# Patient Record
Sex: FEMALE | Race: WHITE | NOT HISPANIC OR LATINO | Employment: FULL TIME | URBAN - METROPOLITAN AREA
[De-identification: names, ages, dates, MRNs, and addresses within clinical notes are randomized per-mention and may not be internally consistent; named-entity substitution may affect disease eponyms.]

---

## 2023-08-17 ENCOUNTER — OFFICE VISIT (OUTPATIENT)
Dept: PRIMARY CARE | Facility: CLINIC | Age: 48
End: 2023-08-17
Payer: COMMERCIAL

## 2023-08-17 VITALS
HEIGHT: 59 IN | DIASTOLIC BLOOD PRESSURE: 79 MMHG | TEMPERATURE: 98 F | WEIGHT: 177.4 LBS | BODY MASS INDEX: 35.76 KG/M2 | SYSTOLIC BLOOD PRESSURE: 116 MMHG | HEART RATE: 64 BPM

## 2023-08-17 DIAGNOSIS — R63.5 ABNORMAL WEIGHT GAIN: Primary | ICD-10-CM

## 2023-08-17 DIAGNOSIS — L25.9 CONTACT DERMATITIS, UNSPECIFIED CONTACT DERMATITIS TYPE, UNSPECIFIED TRIGGER: ICD-10-CM

## 2023-08-17 PROBLEM — E55.9 VITAMIN D DEFICIENCY: Status: ACTIVE | Noted: 2023-08-17

## 2023-08-17 PROBLEM — E66.9 OBESITY: Status: ACTIVE | Noted: 2023-08-17

## 2023-08-17 PROBLEM — L23.9 ALLERGIC DERMATITIS: Status: ACTIVE | Noted: 2023-08-17

## 2023-08-17 PROBLEM — D50.9 ANEMIA, IRON DEFICIENCY: Status: ACTIVE | Noted: 2023-08-17

## 2023-08-17 PROBLEM — M54.12 RIGHT CERVICAL RADICULOPATHY: Status: ACTIVE | Noted: 2023-08-17

## 2023-08-17 PROCEDURE — 3008F BODY MASS INDEX DOCD: CPT | Performed by: FAMILY MEDICINE

## 2023-08-17 PROCEDURE — 99214 OFFICE O/P EST MOD 30 MIN: CPT | Performed by: FAMILY MEDICINE

## 2023-08-17 PROCEDURE — 1036F TOBACCO NON-USER: CPT | Performed by: FAMILY MEDICINE

## 2023-08-17 RX ORDER — HYDROCORTISONE 25 MG/G
CREAM TOPICAL 2 TIMES DAILY PRN
Qty: 30 G | Refills: 2 | Status: SHIPPED | OUTPATIENT
Start: 2023-08-17 | End: 2024-01-10 | Stop reason: ALTCHOICE

## 2023-08-17 NOTE — PROGRESS NOTES
Subjective   Patient ID: Antonieta Villafuerte is a 47 y.o. female who presents for Weight Loss.  HPI  Patient is a 45 yo WF seen today for the treatment of her weight gain.  She states that she used to respond to phentermine.  She is also requesting a cream for the reactions that she developed after shaving her groins.     A review of system was completed.  All systems were reviewed and were normal, except for the ones that are listed in the HPI.    Objective   Physical Exam  Constitutional:       Appearance: Normal appearance.   HENT:      Head: Normocephalic and atraumatic.      Right Ear: Tympanic membrane, ear canal and external ear normal.      Left Ear: Tympanic membrane, ear canal and external ear normal.      Nose: Nose normal.      Mouth/Throat:      Mouth: Mucous membranes are moist.      Pharynx: Oropharynx is clear.   Eyes:      Extraocular Movements: Extraocular movements intact.      Conjunctiva/sclera: Conjunctivae normal.      Pupils: Pupils are equal, round, and reactive to light.   Cardiovascular:      Rate and Rhythm: Normal rate and regular rhythm.      Pulses: Normal pulses.   Pulmonary:      Effort: Pulmonary effort is normal.      Breath sounds: Normal breath sounds.   Abdominal:      General: Abdomen is flat. Bowel sounds are normal.      Palpations: Abdomen is soft.   Musculoskeletal:         General: Normal range of motion.      Cervical back: Normal range of motion and neck supple.   Skin:     General: Skin is warm.   Neurological:      General: No focal deficit present.      Mental Status: She is alert and oriented to person, place, and time. Mental status is at baseline.   Psychiatric:         Mood and Affect: Mood normal.         Behavior: Behavior normal.         Thought Content: Thought content normal.         Judgment: Judgment normal.         Assessment/Plan   Problem List Items Addressed This Visit       Abnormal weight gain - Primary    Relevant Orders    Referral to Weight  Management - Meal Replacement    BMI 35.0-35.9,adult    Relevant Orders    Referral to Weight Management - Meal Replacement    Contact dermatitis    Relevant Medications    hydrocortisone 2.5 % cream            Patient to return to office as needed.

## 2023-08-28 ENCOUNTER — TELEPHONE (OUTPATIENT)
Dept: PRIMARY CARE | Facility: CLINIC | Age: 48
End: 2023-08-28
Payer: COMMERCIAL

## 2023-08-28 NOTE — TELEPHONE ENCOUNTER
Pt called requesting that you change the weight management order. The order has to say comprehensive weight management.

## 2023-10-17 ENCOUNTER — PHARMACY VISIT (OUTPATIENT)
Dept: PHARMACY | Facility: CLINIC | Age: 48
End: 2023-10-17
Payer: COMMERCIAL

## 2023-10-17 PROCEDURE — RXMED WILLOW AMBULATORY MEDICATION CHARGE

## 2024-01-03 DIAGNOSIS — Z30.41 ENCOUNTER FOR SURVEILLANCE OF CONTRACEPTIVE PILLS: Primary | ICD-10-CM

## 2024-01-03 RX ORDER — NORETHINDRONE AND ETHINYL ESTRADIOL 1 MG-35MCG
KIT ORAL
Qty: 448 TABLET | Refills: 0 | Status: SHIPPED | OUTPATIENT
Start: 2024-01-03 | End: 2024-02-13 | Stop reason: SDUPTHER

## 2024-01-04 PROCEDURE — RXMED WILLOW AMBULATORY MEDICATION CHARGE

## 2024-01-05 ENCOUNTER — PHARMACY VISIT (OUTPATIENT)
Dept: PHARMACY | Facility: CLINIC | Age: 49
End: 2024-01-05
Payer: COMMERCIAL

## 2024-01-10 ENCOUNTER — OFFICE VISIT (OUTPATIENT)
Dept: ENDOCRINOLOGY | Facility: CLINIC | Age: 49
End: 2024-01-10
Payer: COMMERCIAL

## 2024-01-10 VITALS
DIASTOLIC BLOOD PRESSURE: 85 MMHG | WEIGHT: 169.5 LBS | TEMPERATURE: 97.1 F | BODY MASS INDEX: 34.17 KG/M2 | SYSTOLIC BLOOD PRESSURE: 136 MMHG | HEIGHT: 59 IN | HEART RATE: 67 BPM

## 2024-01-10 DIAGNOSIS — E66.8 CONSTITUTIONAL OBESITY: ICD-10-CM

## 2024-01-10 DIAGNOSIS — Z76.89 ENCOUNTER FOR WEIGHT MANAGEMENT: ICD-10-CM

## 2024-01-10 PROCEDURE — 99204 OFFICE O/P NEW MOD 45 MIN: CPT | Performed by: INTERNAL MEDICINE

## 2024-01-10 PROCEDURE — 1036F TOBACCO NON-USER: CPT | Performed by: INTERNAL MEDICINE

## 2024-01-10 PROCEDURE — 3008F BODY MASS INDEX DOCD: CPT | Performed by: INTERNAL MEDICINE

## 2024-01-10 RX ORDER — PHENTERMINE HYDROCHLORIDE 37.5 MG/1
TABLET ORAL
Qty: 30 TABLET | Refills: 2 | Status: SHIPPED | OUTPATIENT
Start: 2024-01-10 | End: 2024-05-01

## 2024-01-10 NOTE — PROGRESS NOTES
Patient is seen at the request of Tiana Hensley for my opinion regarding weight management. My final recommendations will be communicated back to the requesting provider by way of shared medical record.    NEW  Subjective   Antonieta Villafuerte is a 48 y.o. female with a hx of anemia, obesity, vit D defic,  who presents for weight management and obesity.  No h/o glaucoma or kidney stones.    Used to see Tiana Hensley  Weight has been a gradual weight gain over the last 10 years.    1. Weight history: weight became an issue gradually in the last 10 years. Gained 10-15lbs in the last year.   --Any previous programs: No    2. Sleep: trouble getting to sleep- does not go to sleep till 1am      3. Stress: 6/10,  for , , 2 kids     4. Exercise: No     5. Appetite control: boredom eat, craves sweets   Breakfast: 3/7 days a week. 2 waffles/piece of cake/ sweets   Lunch: salad/ cheese sticks/ leftovers  Dinner: chicken meals in the crock pot  Take out: twice a week     Any personal history of pancreatitis?: No  Any personal or family history of medullary thyroid cancer or MEN (multiple endocrine neoplasia)?: No    --has taken Saxenda 4 years ago - and liked it  --took phentermine 3 years ago  --took qsymia      Current Outpatient Medications:     norethindrone-ethin estradioL (Alyacen 1/35, 28,) 1-35 mg-mcg tablet, TAKE 1 TABLET BY MOUTH ONCE DAILY CONTINUOUSLY, Disp: 448 tablet, Rfl: 0    ROS:  System: normal  Eyes : no visual changes  Neck : no tenderness, no new lumps/bumps  Respiratory : no SOB  Cardiovascular : no chest pain, no palpitations  Gastro-Intestinal : no abdominal concerns  Neurological : no numbness or tingling in the extremities  Musculoskeletal : no joint paint, no muscle pain  Skin : no unusual rashes  Psychiatric : no depression, no anxiety  See hospitals for Endocrine ROS    Past Medical History:   Diagnosis Date    Acute lymphangitis, unspecified 05/28/2019    Acute lymphangitis     Allergic contact dermatitis due to plants, except food 09/25/2018    Poison ivy    Allergic contact dermatitis, unspecified cause 03/10/2022    Allergic dermatitis    Cellulitis of right lower limb 05/28/2019    Cellulitis of foot, right    Dermatochalasis of right eye, unspecified eyelid 07/05/2017    Dermatochalasis of eyelids of both eyes    Encounter for screening for other suspected endocrine disorder 02/20/2019    Screening for endocrine, nutritional, metabolic and immunity disorder    Otalgia, right ear 05/20/2021    Acute pain of right ear    Pain in unspecified ankle and joints of unspecified foot 02/24/2014    Ankle pain    Pain in unspecified hand 12/16/2015    Hand pain    Personal history of diseases of the skin and subcutaneous tissue 12/19/2018    History of acute dermatitis    Personal history of other (healed) physical injury and trauma 02/12/2014    History of sprain of ankle    Personal history of other diseases of the respiratory system     Personal history of sinusitis    Personal history of other medical treatment 10/23/2017    History of screening mammography    Personal history of urinary (tract) infections 03/16/2015    History of urinary tract infection       Past Surgical History:   Procedure Laterality Date    OTHER SURGICAL HISTORY  05/03/2018    Blepharoplasty Upper Lid W/ Excessive Skin       Social History     Socioeconomic History    Marital status:      Spouse name: Not on file    Number of children: Not on file    Years of education: Not on file    Highest education level: Not on file   Occupational History    Not on file   Tobacco Use    Smoking status: Never    Smokeless tobacco: Never   Substance and Sexual Activity    Alcohol use: Never    Drug use: Never    Sexual activity: Not on file   Other Topics Concern    Not on file   Social History Narrative    Not on file     Social Determinants of Health     Financial Resource Strain: Not on file   Food Insecurity: Not on file    Transportation Needs: Not on file   Physical Activity: Not on file   Stress: Not on file   Social Connections: Not on file   Intimate Partner Violence: Not on file   Housing Stability: Not on file       Objective    Physical Exam:  There were no vitals taken for this visit.  General : alert and oriented X3, no acute distress  Eyes : EOMI   Neck : non tender, supple  Thyroid : no palpable nodules  Heart : regular rate and rhythm, ?soft murmur (asked she discuss with PCP)  ABD : no obvious abnormalities, soft to palpation  Extremities : no edema  Neuro : normal  Other :    Assessment/Plan   Antonieta Villafuerte is a 48 y.o. female with a hx of anemia, obesity, vit D defic,  who presents for weight management and obesity.  No h/o glaucoma or kidney stones.    1. Weight Management : Reviewed the principles of energy metabolism, caloric intake and expenditure, and rationale for a treatment program.  Also reinforced need for reduced calorie, low fat diet and increased physical activity.    We reviewed the possibility of starting an interdisciplinary lifestyle intervention program involving improvement of the diet, a personalized exercise program, efforts to reduce the stress and the possibility of using appetite suppressant medications in an effort to help with the weight loss process.  The patient expressed interest in the plan.    2. Nutrition : I discussed trying one of the diet approaches we have here in the program : Mediterranean lifestyle, ketosis diet.  The patient will be referred to the Registered Dietician for education on their diet of choice.  The dietary booklet was provided in the visit today.    [unfilled]: 169lb, 34.23kg/m2    3. Sleep : trouble getting to sleep- does not go to sleep till 1am    4. Stress : 6/10,  for , , 2 kids    5. Exercise : No - showed her HASFIT videos - encouraged twice per week 10 min.    6. Appetite : is very high for sweets.  --discussed AOM's in  length  --has taken Saxenda 4 years ago - and liked it  --took phentermine 3 years ago  --took qsymia - tried but had heavy vaginal bleeding while on it.    --she would like to re-try phentermine - will have her sign the stimulant contract and I will check OARRS.    Follow up in a group visit.  Ehsan Ivey MD

## 2024-01-11 ENCOUNTER — TELEPHONE (OUTPATIENT)
Dept: ENDOCRINOLOGY | Facility: CLINIC | Age: 49
End: 2024-01-11
Payer: COMMERCIAL

## 2024-01-17 NOTE — PROGRESS NOTES
"Nutrition: Initial Assessment    Reason for Nutrition Visit: Patient is a 48 y.o. female referred for wt mgmt. Referred on 1/10/24 by Dr. Enriquez.      Nutrition Assessment    Food and Nutrient History: Pt presents virtually for nutrition counseling. She was seeing Comp Wt Mgmt prior to COVID-19. Was on Phentermine and felt that it was successful. Then, switched to Saxenda. Lost 40 lbs. Gradually regained weight. Started back on the Phentermine. High craving for sweets. Goes into the office 2 days per week. Has purchased olive oil. Has made other nutrition changes.     Dietary Recall:  Meal 1: 3 scrambled eggs w/ cheese  Meal 2: skipping lunch // gets salad with grilled chicken if at work  Meal 3: Ukrainian onion chicken  Beverages: water, hot tea with 1 sugar packet, iced tea  - Typically would get soda from [facility] [employer] [facility]     Appetite: Normal  Cooking: Patient  Grocery Shopping: Patient  Dietary Supplements: Denies    Labs:  H/o low vitamin D   H/o low B12  H/o marginally elevated T-chol, high LDL, and high TG    Nutrition Focused Physical Exam:  Performed/Deferred: Deferred due to be being virtual visit    Past Medical History:  Patient Active Problem List   Diagnosis    Allergic dermatitis    Anemia, iron deficiency    Obesity    Vitamin D deficiency    Right cervical radiculopathy    Abnormal weight gain    BMI 35.0-35.9,adult    Contact dermatitis        Anthropometrics:  Ht Readings from Last 1 Encounters:   01/18/24 1.499 m (4' 11\")     BMI Readings from Last 1 Encounters:   01/18/24 34.23 kg/m²     Wt Readings from Last 10 Encounters:   01/10/24 76.9 kg (169 lb 8 oz)   08/17/23 80.5 kg (177 lb 6.4 oz)   02/13/23 79.8 kg (176 lb)   10/20/22 78 kg (172 lb)   03/18/22 73.5 kg (162 lb)   02/18/22 78 kg (172 lb)   01/12/22 79.8 kg (176 lb)   11/29/21 79.8 kg (176 lb)   06/24/21 75.8 kg (167 lb)   05/20/21 76.2 kg (168 lb 1.6 oz)     Estimated Energy Needs:    Total Energy Estimated Needs (kCal): 1312.4 kCal "   Method for Estimating Needs: MSJ 1302 x 1.2 - 250   Total Protein Estimated Needs (g): 62 g   Total Protein Estimated Needs (g/kg): 0.8 g/kg    Nutrition Diagnosis     Patient has Nutrition Diagnosis: Yes Diagnosis Status (1): New  Nutrition Diagnosis 1: Excessive energy intake Related to (1): high food cravings As Evidenced by (1): patient's report of high food cravings       Nutrition Interventions/Recommendations   Meals & Snacks:  Mediterranean Diet   Consistent meal/snack schedule     Coordination of Care:  Recommended updating vitamin B12 and vitamin D labs at next routine lab draw     Nutrition Education:   Discussed starting an interdisciplinary weight management program that includes group classes focused on goal setting, nutrition, physical activity, and behavior changes along with the possibility of medication options if medically appropriate.  Recommended starting vitamin D3  5000 International Units daily from a [employer] certified brand.   Discussed adding vitamin B12, especially if future blood work shows low levels.   Talked about drinking tea between meals and not with meals due to ability of tea to block absorption of minerals, such as iron.   *Patient expressed understanding of the nutrition education and denied any additional questions/concerns.     Educational Handouts: Mediterranean Meal Plan Booklet     Nutrition Monitoring and Evaluation   Intentional weight loss of 0.5-2 lb per week, trending toward a clinically significant weight loss of 5-10% of current body weight  Consistent meal/snack pattern  Increased awareness and response to hunger and satiety cues      Readiness to Change : Excellent  Level of Understanding : Excellent  Anticipated Compliant : Excellent     Follow-up: Patient is welcome to follow-up at any time. To schedule, please call [phone].

## 2024-01-18 ENCOUNTER — TELEMEDICINE CLINICAL SUPPORT (OUTPATIENT)
Dept: NUTRITION | Facility: CLINIC | Age: 49
End: 2024-01-18
Payer: COMMERCIAL

## 2024-01-18 VITALS — BODY MASS INDEX: 34.23 KG/M2 | HEIGHT: 59 IN

## 2024-01-18 DIAGNOSIS — Z71.3 DIETARY COUNSELING AND SURVEILLANCE: Primary | ICD-10-CM

## 2024-01-18 DIAGNOSIS — E66.9 OBESITY, UNSPECIFIED CLASSIFICATION, UNSPECIFIED OBESITY TYPE, UNSPECIFIED WHETHER SERIOUS COMORBIDITY PRESENT: ICD-10-CM

## 2024-01-18 PROCEDURE — 97802 MEDICAL NUTRITION INDIV IN: CPT

## 2024-01-18 PROCEDURE — 97802 MEDICAL NUTRITION INDIV IN: CPT | Mod: 95

## 2024-02-13 ENCOUNTER — OFFICE VISIT (OUTPATIENT)
Dept: OBSTETRICS AND GYNECOLOGY | Facility: CLINIC | Age: 49
End: 2024-02-13
Payer: COMMERCIAL

## 2024-02-13 VITALS
SYSTOLIC BLOOD PRESSURE: 108 MMHG | BODY MASS INDEX: 33.87 KG/M2 | DIASTOLIC BLOOD PRESSURE: 71 MMHG | WEIGHT: 168 LBS | HEIGHT: 59 IN

## 2024-02-13 DIAGNOSIS — R39.9 UTI SYMPTOMS: ICD-10-CM

## 2024-02-13 DIAGNOSIS — Z30.41 ENCOUNTER FOR SURVEILLANCE OF CONTRACEPTIVE PILLS: ICD-10-CM

## 2024-02-13 DIAGNOSIS — Z01.419 ENCOUNTER FOR GYNECOLOGICAL EXAMINATION WITHOUT ABNORMAL FINDING: Primary | ICD-10-CM

## 2024-02-13 PROCEDURE — 1036F TOBACCO NON-USER: CPT | Performed by: OBSTETRICS & GYNECOLOGY

## 2024-02-13 PROCEDURE — 3008F BODY MASS INDEX DOCD: CPT | Performed by: OBSTETRICS & GYNECOLOGY

## 2024-02-13 PROCEDURE — 99396 PREV VISIT EST AGE 40-64: CPT | Performed by: OBSTETRICS & GYNECOLOGY

## 2024-02-13 PROCEDURE — 87624 HPV HI-RISK TYP POOLED RSLT: CPT

## 2024-02-13 PROCEDURE — 88175 CYTOPATH C/V AUTO FLUID REDO: CPT

## 2024-02-13 RX ORDER — NORETHINDRONE AND ETHINYL ESTRADIOL 1 MG-35MCG
KIT ORAL
Qty: 448 TABLET | Refills: 4 | Status: SHIPPED | OUTPATIENT
Start: 2024-02-13 | End: 2025-02-11

## 2024-02-13 NOTE — PROGRESS NOTES
Subjective   Antonieta Villafuerte is a 48 y.o. female here for a routine exam. Current complaints: She mentions possible UTI symptoms.  She has about 3 to 4-day history of urinary odor and bright color.  She denies any pressure or urgency.  No fevers or chills.  No change in bowel habits or vaginal discharge.    She uses a birth control pills continuously and has no breakthrough bleeding.. Personal health questionnaire reviewed: yes.     Gynecologic History  No LMP recorded (lmp unknown).  Contraception: OCP (estrogen/progesterone)  Last Pap: 11/29/21. Results were: normal  Last mammogram: 9/24/2020. Results were: normal    Obstetric History  OB History   No obstetric history on file.       Objective   Constitutional: Alert and in no acute distress. Well developed, well nourished.   Head and Face: Head and face: Normal.    Eyes: Normal external exam - nonicteric sclera, extraocular movements intact (EOMI) and no ptosis.   Neck: No neck asymmetry. Supple. Thyroid not enlarged and there were no palpable thyroid nodules.    Pulmonary: No respiratory distress.   Chest: Breasts: Normal appearance, no nipple discharge and no skin changes. Palpation of breasts and axillae: No palpable mass and no axillary lymphadenopathy.   Abdomen: Soft nontender; no abdominal mass palpated. No organomegaly. No hernias.   Genitourinary: External genitalia: Normal. No inguinal lymphadenopathy. Bartholin's Urethral and Skenes Glands: Normal. Urethra: Normal.  Bladder: Normal on palpation. Vagina: Normal. Cervix: Normal.  Uterus: Normal.  Right Adnexa/parametria: Normal.  Left Adnexa/parametria: Normal.  Inspection of Perianal Area: Normal.   Musculoskeletal: No joint swelling seen, normal movements of all extremities.   Skin: Normal skin color and pigmentation, normal skin turgor, and no rash.   Neurologic: Non-focal. Grossly intact.   Psychiatric: Alert and oriented x 3. Affect normal to patient baseline. Mood: Appropriate.  Physical Exam      Assessment/Plan   Healthy female exam.  This is a 48-year-old female with a normal exam.  A Pap was sent.    We discussed a urine culture was ordered to evaluate for UTI symptoms, we discussed that her symptoms are suspicious for concentrated urine.  I recommend increasing fluids.    A refill for birth control was ordered to the  [place] location.    Her routine mammogram was ordered with tomosynthesis.  I will see her routinely in 1 year.  Mammogram ordered.

## 2024-02-23 LAB
CYTOLOGY CMNT CVX/VAG CYTO-IMP: NORMAL
HPV HR 12 DNA GENITAL QL NAA+PROBE: NEGATIVE
HPV HR GENOTYPES PNL CVX NAA+PROBE: NEGATIVE
HPV16 DNA SPEC QL NAA+PROBE: NEGATIVE
HPV18 DNA SPEC QL NAA+PROBE: NEGATIVE
LAB AP CONTRACEPTIVE HISTORY: NORMAL
LAB AP HPV GENOTYPE QUESTION: YES
LAB AP HPV HR: NORMAL
LABORATORY COMMENT REPORT: NORMAL
PATH REPORT.TOTAL CANCER: NORMAL

## 2024-03-04 PROCEDURE — RXMED WILLOW AMBULATORY MEDICATION CHARGE

## 2024-03-05 ENCOUNTER — PHARMACY VISIT (OUTPATIENT)
Dept: PHARMACY | Facility: CLINIC | Age: 49
End: 2024-03-05
Payer: COMMERCIAL

## 2024-03-23 ENCOUNTER — HOSPITAL ENCOUNTER (OUTPATIENT)
Dept: RADIOLOGY | Facility: HOSPITAL | Age: 49
Discharge: HOME | End: 2024-03-23
Payer: COMMERCIAL

## 2024-03-23 VITALS — BODY MASS INDEX: 33.87 KG/M2 | HEIGHT: 59 IN | WEIGHT: 168 LBS

## 2024-03-23 DIAGNOSIS — Z12.31 SCREENING MAMMOGRAM FOR BREAST CANCER: ICD-10-CM

## 2024-03-23 PROCEDURE — 77067 SCR MAMMO BI INCL CAD: CPT | Performed by: RADIOLOGY

## 2024-03-23 PROCEDURE — 77063 BREAST TOMOSYNTHESIS BI: CPT | Performed by: RADIOLOGY

## 2024-03-23 PROCEDURE — 77067 SCR MAMMO BI INCL CAD: CPT

## 2024-05-01 RX ORDER — PHENTERMINE HYDROCHLORIDE 37.5 MG/1
TABLET ORAL
Qty: 30 TABLET | Refills: 2 | Status: SHIPPED | OUTPATIENT
Start: 2024-05-01 | End: 2024-05-01 | Stop reason: SDUPTHER

## 2024-05-01 RX ORDER — PHENTERMINE HYDROCHLORIDE 37.5 MG/1
TABLET ORAL
Qty: 30 TABLET | Refills: 2 | Status: SHIPPED | OUTPATIENT
Start: 2024-05-01

## 2024-05-06 PROCEDURE — RXMED WILLOW AMBULATORY MEDICATION CHARGE

## 2024-05-10 ENCOUNTER — PHARMACY VISIT (OUTPATIENT)
Dept: PHARMACY | Facility: CLINIC | Age: 49
End: 2024-05-10
Payer: COMMERCIAL

## 2024-05-22 PROCEDURE — RXMED WILLOW AMBULATORY MEDICATION CHARGE

## 2024-05-23 ENCOUNTER — PHARMACY VISIT (OUTPATIENT)
Dept: PHARMACY | Facility: CLINIC | Age: 49
End: 2024-05-23
Payer: COMMERCIAL

## 2024-05-28 ENCOUNTER — APPOINTMENT (OUTPATIENT)
Dept: ENDOCRINOLOGY | Facility: CLINIC | Age: 49
End: 2024-05-28
Payer: COMMERCIAL

## 2024-06-03 PROCEDURE — RXMED WILLOW AMBULATORY MEDICATION CHARGE

## 2024-06-06 ENCOUNTER — PHARMACY VISIT (OUTPATIENT)
Dept: PHARMACY | Facility: CLINIC | Age: 49
End: 2024-06-06
Payer: COMMERCIAL

## 2024-07-09 ENCOUNTER — PHARMACY VISIT (OUTPATIENT)
Dept: PHARMACY | Facility: CLINIC | Age: 49
End: 2024-07-09

## 2024-07-09 PROCEDURE — RXMED WILLOW AMBULATORY MEDICATION CHARGE

## 2024-07-18 PROCEDURE — RXMED WILLOW AMBULATORY MEDICATION CHARGE

## 2024-07-19 ENCOUNTER — PHARMACY VISIT (OUTPATIENT)
Dept: PHARMACY | Facility: CLINIC | Age: 49
End: 2024-07-19
Payer: COMMERCIAL

## 2024-07-30 RX ORDER — PHENTERMINE HYDROCHLORIDE 37.5 MG/1
TABLET ORAL
Qty: 30 TABLET | Refills: 2 | Status: SHIPPED | OUTPATIENT
Start: 2024-07-30

## 2024-09-01 PROCEDURE — RXMED WILLOW AMBULATORY MEDICATION CHARGE

## 2024-09-04 ENCOUNTER — PHARMACY VISIT (OUTPATIENT)
Dept: PHARMACY | Facility: CLINIC | Age: 49
End: 2024-09-04
Payer: COMMERCIAL

## 2024-10-02 PROCEDURE — RXMED WILLOW AMBULATORY MEDICATION CHARGE

## 2024-10-08 ENCOUNTER — PHARMACY VISIT (OUTPATIENT)
Dept: PHARMACY | Facility: CLINIC | Age: 49
End: 2024-10-08
Payer: COMMERCIAL

## 2024-11-05 PROCEDURE — RXMED WILLOW AMBULATORY MEDICATION CHARGE

## 2024-11-07 ENCOUNTER — PHARMACY VISIT (OUTPATIENT)
Dept: PHARMACY | Facility: CLINIC | Age: 49
End: 2024-11-07
Payer: COMMERCIAL

## 2024-11-30 RX ORDER — PHENTERMINE HYDROCHLORIDE 37.5 MG/1
TABLET ORAL
Qty: 30 TABLET | Refills: 2 | Status: CANCELLED | OUTPATIENT
Start: 2024-11-30

## 2024-12-13 ENCOUNTER — PHARMACY VISIT (OUTPATIENT)
Dept: PHARMACY | Facility: CLINIC | Age: 49
End: 2024-12-13
Payer: COMMERCIAL

## 2024-12-13 PROCEDURE — RXMED WILLOW AMBULATORY MEDICATION CHARGE

## 2024-12-18 PROCEDURE — RXMED WILLOW AMBULATORY MEDICATION CHARGE

## 2024-12-18 RX ORDER — PHENTERMINE HYDROCHLORIDE 37.5 MG/1
TABLET ORAL
Qty: 30 TABLET | Refills: 2 | Status: SHIPPED | OUTPATIENT
Start: 2024-12-18

## 2024-12-20 ENCOUNTER — PHARMACY VISIT (OUTPATIENT)
Dept: PHARMACY | Facility: CLINIC | Age: 49
End: 2024-12-20
Payer: COMMERCIAL

## 2025-01-19 PROCEDURE — RXMED WILLOW AMBULATORY MEDICATION CHARGE

## 2025-01-20 ENCOUNTER — PHARMACY VISIT (OUTPATIENT)
Dept: PHARMACY | Facility: CLINIC | Age: 50
End: 2025-01-20
Payer: COMMERCIAL

## 2025-01-22 ENCOUNTER — OFFICE VISIT (OUTPATIENT)
Dept: URGENT CARE | Age: 50
End: 2025-01-22
Payer: COMMERCIAL

## 2025-01-22 VITALS
SYSTOLIC BLOOD PRESSURE: 119 MMHG | DIASTOLIC BLOOD PRESSURE: 79 MMHG | RESPIRATION RATE: 16 BRPM | HEART RATE: 93 BPM | OXYGEN SATURATION: 94 %

## 2025-01-22 DIAGNOSIS — J40 BRONCHITIS: Primary | ICD-10-CM

## 2025-01-22 RX ORDER — AZITHROMYCIN 250 MG/1
TABLET, FILM COATED ORAL
Qty: 6 TABLET | Refills: 0 | Status: SHIPPED | OUTPATIENT
Start: 2025-01-22 | End: 2025-01-27

## 2025-01-22 RX ORDER — PREDNISONE 20 MG/1
40 TABLET ORAL DAILY
Qty: 10 TABLET | Refills: 0 | Status: SHIPPED | OUTPATIENT
Start: 2025-01-22 | End: 2025-01-27

## 2025-01-22 RX ORDER — ALBUTEROL SULFATE 90 UG/1
2 INHALANT RESPIRATORY (INHALATION) EVERY 4 HOURS PRN
Qty: 200 G | Refills: 0 | Status: SHIPPED | OUTPATIENT
Start: 2025-01-22 | End: 2026-01-22

## 2025-01-22 ASSESSMENT — ENCOUNTER SYMPTOMS
PALPITATIONS: 0
FEVER: 1
RHINORRHEA: 0
CHILLS: 1
CHEST TIGHTNESS: 0
MYALGIAS: 1
CONSTIPATION: 0
NAUSEA: 0
WHEEZING: 0
COUGH: 1
TROUBLE SWALLOWING: 0
DIARRHEA: 0
SORE THROAT: 0
SHORTNESS OF BREATH: 0

## 2025-01-22 NOTE — PATIENT INSTRUCTIONS
Suspected bronchitis  Start antibiotic  Start steroid, inhaler (every 4-6 hours to start then as needed every 4-6 hours, 2 puffs each time)    Recommended Salt water gargles, hot tea and honey, tylenol/ibuprofen    Recommended mucinex over the counter    Alternate tylenol/ibuprofen every 3 hours  Tylenol: 500-1000 mg every 6 hours (do not exceed 4000 mg in 24 hour period)  Ibuprofen 400-600 mg every 6 hours.     If you develop chest pain, shortness of breath, fever not reduced with tylenol/ibuprofen go to ER.     Follow up with pcp.

## 2025-01-22 NOTE — PROGRESS NOTES
Subjective   Patient ID: Antonieta Villafuerte is a 49 y.o. female. They present today with a chief complaint of Cough (Pt c/o new onset productive cough following 8 days of congestion, fatigue, and body aches).    History of Present Illness  Patient presents with 8-9 days of dry cough, coughing fits and fever. Claims body aches, chills. Denies n/v/d. Denies chest pain, sob. Denies congestion, sore throat. Denies history of asthma, copd or hospitalization for pneumonia in the past. Cough has become productive over the last today with return of low grade fever.       Cough  Associated symptoms include chills, a fever and myalgias. Pertinent negatives include no chest pain, ear pain, postnasal drip, rash, rhinorrhea, sore throat, shortness of breath or wheezing.       Past Medical History  Allergies as of 01/22/2025    (No Known Allergies)       (Not in a hospital admission)       Past Medical History:   Diagnosis Date    Acute lymphangitis, unspecified 05/28/2019    Acute lymphangitis    Allergic contact dermatitis due to plants, except food 09/25/2018    Poison ivy    Allergic contact dermatitis, unspecified cause 03/10/2022    Allergic dermatitis    Cellulitis of right lower limb 05/28/2019    Cellulitis of foot, right    Dermatochalasis of right eye, unspecified eyelid 07/05/2017    Dermatochalasis of eyelids of both eyes    Encounter for screening for other suspected endocrine disorder 02/20/2019    Screening for endocrine, nutritional, metabolic and immunity disorder    Otalgia, right ear 05/20/2021    Acute pain of right ear    Pain in unspecified ankle and joints of unspecified foot 02/24/2014    Ankle pain    Pain in unspecified hand 12/16/2015    Hand pain    Personal history of diseases of the skin and subcutaneous tissue 12/19/2018    History of acute dermatitis    Personal history of other (healed) physical injury and trauma 02/12/2014    History of sprain of ankle    Personal history of other diseases of  the respiratory system     Personal history of sinusitis    Personal history of other medical treatment 10/23/2017    History of screening mammography    Personal history of urinary (tract) infections 03/16/2015    History of urinary tract infection       Past Surgical History:   Procedure Laterality Date    OTHER SURGICAL HISTORY  05/03/2018    Blepharoplasty Upper Lid W/ Excessive Skin        reports that she has never smoked. She has never used smokeless tobacco. She reports that she does not drink alcohol and does not use drugs.    Review of Systems  Review of Systems   Constitutional:  Positive for chills and fever.   HENT:  Negative for congestion, ear pain, postnasal drip, rhinorrhea, sore throat and trouble swallowing.    Respiratory:  Positive for cough. Negative for chest tightness, shortness of breath and wheezing.    Cardiovascular:  Negative for chest pain and palpitations.   Gastrointestinal:  Negative for constipation, diarrhea and nausea.   Musculoskeletal:  Positive for myalgias.   Skin:  Negative for rash.                                  Objective    Vitals:    01/22/25 1300   BP: 119/79   Pulse: 93   Resp: 16   SpO2: 94%     No LMP recorded.    Physical Exam  Constitutional:       General: She is not in acute distress.     Appearance: She is not toxic-appearing.   HENT:      Right Ear: Tympanic membrane and external ear normal.      Left Ear: Tympanic membrane and external ear normal.      Nose: No congestion.      Right Sinus: No frontal sinus tenderness.      Left Sinus: No frontal sinus tenderness.      Mouth/Throat:      Mouth: Mucous membranes are moist. No oral lesions.      Pharynx: Postnasal drip present. No oropharyngeal exudate or posterior oropharyngeal erythema.   Eyes:      Pupils: Pupils are equal, round, and reactive to light.   Cardiovascular:      Rate and Rhythm: Normal rate and regular rhythm.   Pulmonary:      Effort: Pulmonary effort is normal. No respiratory distress.       Breath sounds: Examination of the right-upper field reveals wheezing. Examination of the left-upper field reveals wheezing. Examination of the right-middle field reveals wheezing. Examination of the left-middle field reveals wheezing. Examination of the right-lower field reveals wheezing. Examination of the left-lower field reveals wheezing. Wheezing present.      Comments: Mild expiratory wheeze noted diffuse b/l. Normal resp effort.   Musculoskeletal:      Cervical back: Normal range of motion.   Neurological:      Mental Status: She is alert.         Procedures    Point of Care Test & Imaging Results from this visit  No results found for this visit on 01/22/25.   No results found.    Diagnostic study results (if any) were reviewed by Radha Castro PA-C.    Assessment/Plan   Allergies, medications, history, and pertinent labs/EKGs/Imaging reviewed by Radha Castro PA-C.     Medical Decision Making  MDM- History and exam consistent with acute bronchitis. No evidence of pneumonia, sepsis or other acute cardiopulmonary pathology. Based on current exam I don't feel that imaging, labs, or further work up are warranted at this point. Based on current exam and past medical history, plan is for antibiotics and symptomatic therapies. Patient advised to return to clinic or go to the ED if symptoms change or worsen. Patient verbalized understanding and agrees with plan.       Orders and Diagnoses  Diagnoses and all orders for this visit:  Bronchitis  -     predniSONE (Deltasone) 20 mg tablet; Take 2 tablets (40 mg) by mouth once daily for 5 days.  -     albuterol (ProAir HFA) 90 mcg/actuation inhaler; Inhale 2 puffs every 4 hours if needed for wheezing or shortness of breath.  -     azithromycin (Zithromax) 250 mg tablet; Take 2 tabs (500 mg) by mouth today, than 1 daily for 4 days.      Medical Admin Record      Patient disposition: Home    Electronically signed by Radha Castro PA-C  1:09 PM

## 2025-02-07 ENCOUNTER — OFFICE VISIT (OUTPATIENT)
Dept: PRIMARY CARE | Facility: CLINIC | Age: 50
End: 2025-02-07
Payer: COMMERCIAL

## 2025-02-07 ENCOUNTER — HOSPITAL ENCOUNTER (OUTPATIENT)
Dept: RADIOLOGY | Facility: CLINIC | Age: 50
Discharge: HOME | End: 2025-02-07
Payer: COMMERCIAL

## 2025-02-07 VITALS
BODY MASS INDEX: 33.33 KG/M2 | WEIGHT: 165 LBS | DIASTOLIC BLOOD PRESSURE: 71 MMHG | TEMPERATURE: 98.2 F | SYSTOLIC BLOOD PRESSURE: 109 MMHG | HEART RATE: 70 BPM

## 2025-02-07 DIAGNOSIS — J20.9 ACUTE BRONCHITIS, UNSPECIFIED ORGANISM: ICD-10-CM

## 2025-02-07 DIAGNOSIS — N30.00 ACUTE CYSTITIS WITHOUT HEMATURIA: Primary | ICD-10-CM

## 2025-02-07 DIAGNOSIS — Z11.59 ENCOUNTER FOR HEPATITIS C SCREENING TEST FOR LOW RISK PATIENT: ICD-10-CM

## 2025-02-07 DIAGNOSIS — Z13.1 DIABETES MELLITUS SCREENING: ICD-10-CM

## 2025-02-07 DIAGNOSIS — Z12.11 COLON CANCER SCREENING: ICD-10-CM

## 2025-02-07 DIAGNOSIS — Z13.220 SCREENING CHOLESTEROL LEVEL: ICD-10-CM

## 2025-02-07 DIAGNOSIS — Z00.00 HEALTH CARE MAINTENANCE: ICD-10-CM

## 2025-02-07 DIAGNOSIS — Z11.4 ENCOUNTER FOR SCREENING FOR HIV: ICD-10-CM

## 2025-02-07 DIAGNOSIS — Z23 IMMUNIZATION DUE: ICD-10-CM

## 2025-02-07 DIAGNOSIS — Z12.31 VISIT FOR SCREENING MAMMOGRAM: ICD-10-CM

## 2025-02-07 PROBLEM — N10 ACUTE PYELONEPHRITIS: Status: ACTIVE | Noted: 2025-02-07

## 2025-02-07 PROCEDURE — RXMED WILLOW AMBULATORY MEDICATION CHARGE

## 2025-02-07 PROCEDURE — 71046 X-RAY EXAM CHEST 2 VIEWS: CPT

## 2025-02-07 PROCEDURE — 99396 PREV VISIT EST AGE 40-64: CPT | Performed by: FAMILY MEDICINE

## 2025-02-07 PROCEDURE — 99214 OFFICE O/P EST MOD 30 MIN: CPT | Performed by: FAMILY MEDICINE

## 2025-02-07 RX ORDER — BUDESONIDE, GLYCOPYRROLATE, AND FORMOTEROL FUMARATE 160; 9; 4.8 UG/1; UG/1; UG/1
2 AEROSOL, METERED RESPIRATORY (INHALATION) 2 TIMES DAILY
Qty: 10.7 G | Refills: 1 | Status: SHIPPED | OUTPATIENT
Start: 2025-02-07

## 2025-02-07 NOTE — ASSESSMENT & PLAN NOTE
-Partially resolved.  She has not responded to the inhaler given at the local urgent care. The prednisone and Zpack partially helped.  -CXR ordered.  -Bretzi 2 puffs BID for 1 month started.

## 2025-02-07 NOTE — ASSESSMENT & PLAN NOTE
-mammogram 3/23/2024. Recommended.  Patient states that it will  be ordered by her GYN this month.   -colonoscopy never done.  Declined.  Patient states that she already had a Cologuard kit at home.   -pap smear 02/13/2024.  -blood test ordered.   -immunizations: Influenza UTD at work.  COVID 19 declined.

## 2025-02-07 NOTE — PROGRESS NOTES
Subjective   Patient ID: Antonieta Villafuerte is a 49 y.o. female who presents for a UTI.  HPI  Patient is a 48 yo Female seen today for:    1- CPE.  -mammogram 3/23/2024. Recommended.  Patient states that it will  be ordered by her GYN this month.   -colonoscopy never done.  Declined.  Patient states that she already had a Cologuard kit at home.   -pap smear 02/13/2024.  -blood test ordered.   -immunizations: Influenza vaccine UTD at work.       2- the treatment of a possible UTI.  She has noted a new onset of urinary incontinence that started with her UTI present for the past 4 weeks.    3- Persisting cough and mild difficulty with inhalation.  She has not responded to the inhaler given at the local urgent care. The prednisone and Zpack partially helped.    A review of system was completed.  All systems were reviewed and were normal, except for the ones that are listed in the HPI.    Objective   Physical Exam  Constitutional:       Appearance: Normal appearance.   HENT:      Head: Normocephalic and atraumatic.      Right Ear: Tympanic membrane, ear canal and external ear normal.      Left Ear: Tympanic membrane, ear canal and external ear normal.      Nose: Nose normal.      Mouth/Throat:      Mouth: Mucous membranes are moist.      Pharynx: Oropharynx is clear.   Eyes:      Extraocular Movements: Extraocular movements intact.      Conjunctiva/sclera: Conjunctivae normal.      Pupils: Pupils are equal, round, and reactive to light.   Cardiovascular:      Rate and Rhythm: Normal rate and regular rhythm.      Pulses: Normal pulses.   Pulmonary:      Effort: Pulmonary effort is normal.      Breath sounds: Normal breath sounds.   Abdominal:      General: Abdomen is flat. Bowel sounds are normal.      Palpations: Abdomen is soft.   Musculoskeletal:         General: Normal range of motion.      Cervical back: Normal range of motion and neck supple.   Skin:     General: Skin is warm.      Comments: -Breast and pelvis exam  done by GYN.    Neurological:      General: No focal deficit present.      Mental Status: She is alert and oriented to person, place, and time. Mental status is at baseline.   Psychiatric:         Mood and Affect: Mood normal.         Behavior: Behavior normal.         Thought Content: Thought content normal.         Judgment: Judgment normal.     Assessment/Plan   Problem List Items Addressed This Visit       Acute cystitis without hematuria     -UA w/ C+S ordered.          Relevant Orders    POCT UA Automated manually resulted    Urine Culture    CBC    Comprehensive Metabolic Panel    Health care maintenance - Primary     -mammogram 3/23/2024. Recommended.  Patient states that it will  be ordered by her GYN this month.   -colonoscopy never done.  Declined.  Patient states that she already had a Cologuard kit at home.   -pap smear 02/13/2024.  -blood test ordered.   -immunizations: Influenza UTD at work.  COVID 19 declined.          Relevant Medications    budesonide-glycopyr-formoterol (Breztri Aerosphere) 160-9-4.8 mcg/actuation HFA aerosol inhaler    Other Relevant Orders    Hemoglobin A1C    Lipid Panel    TSH with reflex to Free T4 if abnormal    HIV 1/2 Antigen/Antibody Screen with Reflex to Confirmation    Hepatitis C Antibody    XR chest 2 views    Screening cholesterol level    Relevant Orders    Lipid Panel    Diabetes mellitus screening    Relevant Orders    Hemoglobin A1C    Visit for screening mammogram    Colon cancer screening    Immunization due    Encounter for hepatitis C screening test for low risk patient    Relevant Orders    Hepatitis C Antibody    Encounter for screening for HIV    Relevant Orders    HIV 1/2 Antigen/Antibody Screen with Reflex to Confirmation    Acute bronchitis     -Partially resolved.  She has not responded to the inhaler given at the local urgent care. The prednisone and Zpack partially helped.  -CXR ordered.  -Bretzi 2 puffs BID for 1 month started.         Relevant  Orders    CBC    Comprehensive Metabolic Panel    TSH with reflex to Free T4 if abnormal    XR chest 2 views    Patient to return to office in 2- 4 weeks for reassessment.

## 2025-02-08 ENCOUNTER — PHARMACY VISIT (OUTPATIENT)
Dept: PHARMACY | Facility: CLINIC | Age: 50
End: 2025-02-08
Payer: COMMERCIAL

## 2025-02-08 LAB
ALBUMIN SERPL-MCNC: 4.1 G/DL (ref 3.6–5.1)
ALP SERPL-CCNC: 49 U/L (ref 31–125)
ALT SERPL-CCNC: 8 U/L (ref 6–29)
ANION GAP SERPL CALCULATED.4IONS-SCNC: 8 MMOL/L (CALC) (ref 7–17)
APPEARANCE UR: CLEAR
AST SERPL-CCNC: 10 U/L (ref 10–35)
BACTERIA #/AREA URNS HPF: ABNORMAL /HPF
BILIRUB SERPL-MCNC: 0.4 MG/DL (ref 0.2–1.2)
BILIRUB UR QL STRIP: NEGATIVE
BUN SERPL-MCNC: 12 MG/DL (ref 7–25)
CALCIUM SERPL-MCNC: 9 MG/DL (ref 8.6–10.2)
CHLORIDE SERPL-SCNC: 103 MMOL/L (ref 98–110)
CHOLEST SERPL-MCNC: 227 MG/DL
CHOLEST/HDLC SERPL: 4.2 (CALC)
CO2 SERPL-SCNC: 27 MMOL/L (ref 20–32)
COLOR UR: YELLOW
CREAT SERPL-MCNC: 0.79 MG/DL (ref 0.5–0.99)
EGFRCR SERPLBLD CKD-EPI 2021: 92 ML/MIN/1.73M2
ERYTHROCYTE [DISTWIDTH] IN BLOOD BY AUTOMATED COUNT: 13.6 % (ref 11–15)
EST. AVERAGE GLUCOSE BLD GHB EST-MCNC: 117 MG/DL
EST. AVERAGE GLUCOSE BLD GHB EST-SCNC: 6.5 MMOL/L
GLUCOSE SERPL-MCNC: 87 MG/DL (ref 65–139)
GLUCOSE UR QL STRIP: NEGATIVE
HBA1C MFR BLD: 5.7 % OF TOTAL HGB
HCT VFR BLD AUTO: 39.6 % (ref 35–45)
HCV AB SERPL QL IA: NORMAL
HDLC SERPL-MCNC: 54 MG/DL
HGB BLD-MCNC: 13.1 G/DL (ref 11.7–15.5)
HGB UR QL STRIP: ABNORMAL
HIV 1+2 AB+HIV1 P24 AG SERPL QL IA: NORMAL
HYALINE CASTS #/AREA URNS LPF: ABNORMAL /LPF
KETONES UR QL STRIP: NEGATIVE
LDLC SERPL CALC-MCNC: 136 MG/DL (CALC)
LEUKOCYTE ESTERASE UR QL STRIP: NEGATIVE
MCH RBC QN AUTO: 31.2 PG (ref 27–33)
MCHC RBC AUTO-ENTMCNC: 33.1 G/DL (ref 32–36)
MCV RBC AUTO: 94.3 FL (ref 80–100)
NITRITE UR QL STRIP: NEGATIVE
NONHDLC SERPL-MCNC: 173 MG/DL (CALC)
PH UR STRIP: 7.5 [PH] (ref 5–8)
PLATELET # BLD AUTO: 367 THOUSAND/UL (ref 140–400)
PMV BLD REES-ECKER: 9.9 FL (ref 7.5–12.5)
POTASSIUM SERPL-SCNC: 4.7 MMOL/L (ref 3.5–5.3)
PROT SERPL-MCNC: 7 G/DL (ref 6.1–8.1)
PROT UR QL STRIP: ABNORMAL
RBC # BLD AUTO: 4.2 MILLION/UL (ref 3.8–5.1)
RBC #/AREA URNS HPF: ABNORMAL /HPF
SERVICE CMNT-IMP: ABNORMAL
SODIUM SERPL-SCNC: 138 MMOL/L (ref 135–146)
SP GR UR STRIP: 1.02 (ref 1–1.03)
SQUAMOUS #/AREA URNS HPF: ABNORMAL /HPF
TRIGL SERPL-MCNC: 229 MG/DL
TSH SERPL-ACNC: 1.74 MIU/L
WBC # BLD AUTO: 8.5 THOUSAND/UL (ref 3.8–10.8)
WBC #/AREA URNS HPF: ABNORMAL /HPF

## 2025-02-11 NOTE — RESULT ENCOUNTER NOTE
PatientPatient's blood test showed signs of elevated blood sugar in the prediabetes range and elevated cholesterol in the blood.  Low-fat/low-cholesterol/low sugar/low carbohydrate diet, weight loss and increased exercise is recommended.  The rest of the blood test was normal and the chest x-ray was also normal.

## 2025-02-11 NOTE — RESULT ENCOUNTER NOTE
Patient's blood test showed signs of elevated blood sugar in the prediabetes range and elevated cholesterol in the blood.  Low-fat/low-cholesterol/low sugar/low carbohydrate diet, weight loss and increased exercise is recommended.  The rest of the blood test was normal.

## 2025-02-12 ENCOUNTER — TELEPHONE (OUTPATIENT)
Dept: PRIMARY CARE | Facility: CLINIC | Age: 50
End: 2025-02-12
Payer: COMMERCIAL

## 2025-02-17 DIAGNOSIS — R73.09 ELEVATED HEMOGLOBIN A1C: ICD-10-CM

## 2025-02-17 DIAGNOSIS — E78.00 PURE HYPERCHOLESTEROLEMIA: Primary | ICD-10-CM

## 2025-02-18 ENCOUNTER — APPOINTMENT (OUTPATIENT)
Dept: OBSTETRICS AND GYNECOLOGY | Facility: CLINIC | Age: 50
End: 2025-02-18
Payer: COMMERCIAL

## 2025-02-18 VITALS
SYSTOLIC BLOOD PRESSURE: 125 MMHG | WEIGHT: 175 LBS | HEIGHT: 59 IN | DIASTOLIC BLOOD PRESSURE: 76 MMHG | HEART RATE: 65 BPM | BODY MASS INDEX: 35.28 KG/M2

## 2025-02-18 DIAGNOSIS — Z12.11 SCREEN FOR COLON CANCER: Primary | ICD-10-CM

## 2025-02-18 DIAGNOSIS — Z01.419 ENCOUNTER FOR GYNECOLOGICAL EXAMINATION WITHOUT ABNORMAL FINDING: ICD-10-CM

## 2025-02-18 DIAGNOSIS — Z30.41 ENCOUNTER FOR SURVEILLANCE OF CONTRACEPTIVE PILLS: ICD-10-CM

## 2025-02-18 PROCEDURE — RXMED WILLOW AMBULATORY MEDICATION CHARGE

## 2025-02-18 RX ORDER — NORETHINDRONE AND ETHINYL ESTRADIOL 1 MG-35MCG
KIT ORAL
Qty: 448 TABLET | Refills: 4 | Status: SHIPPED | OUTPATIENT
Start: 2025-02-18 | End: 2026-02-17

## 2025-02-18 NOTE — PROGRESS NOTES
Subjective   Antonieta Villafuerte is a 49 y.o. female here for a routine exam.  She uses a birth control pills continuously and has cycles on schedule.  There is no breakthrough bleeding.  No discharge no dysuria, no change in bowel habits.  She has noted some stress incontinence with recent coughing, discussed Kegel exercises.    There are no menopausal symptoms.  Personal health questionnaire reviewed: yes.     Gynecologic History  No LMP recorded (lmp unknown).  Contraception: OCP (estrogen/progesterone)  Last Pap: 24. Results were: normal  Last mammogram: 3/23/24. Results were: normal    Obstetric History  OB History    Para Term  AB Living   3 2           SAB IAB Ectopic Multiple Live Births                  # Outcome Date GA Lbr Cecil/2nd Weight Sex Type Anes PTL Lv   3             2 Para            1 Para                Objective   Constitutional: Alert and in no acute distress. Well developed, well nourished.   Head and Face: Head and face: Normal.    Eyes: Normal external exam - nonicteric sclera, extraocular movements intact (EOMI) and no ptosis.   Neck: No neck asymmetry. Supple. Thyroid not enlarged and there were no palpable thyroid nodules.    Pulmonary: No respiratory distress.   Chest: Breasts: Normal appearance, no nipple discharge and no skin changes. Palpation of breasts and axillae: No palpable mass and no axillary lymphadenopathy.   Abdomen: Soft nontender; no abdominal mass palpated. No organomegaly. No hernias.   Genitourinary: External genitalia: Normal. No inguinal lymphadenopathy. Bartholin's Urethral and Skenes Glands: Normal. Urethra: Normal.  Bladder: Normal on palpation. Vagina: Normal. Cervix: Normal.  Uterus: Normal.  Right Adnexa/parametria: Normal.  Left Adnexa/parametria: Normal.  Inspection of Perianal Area: Normal.   Musculoskeletal: No joint swelling seen, normal movements of all extremities.   Skin: Normal skin color and pigmentation, normal skin turgor,  and no rash.   Neurologic: Non-focal. Grossly intact.   Psychiatric: Alert and oriented x 3. Affect normal to patient baseline. Mood: Appropriate.  Physical Exam     Assessment/Plan   Healthy female exam.  This is a 49-year-old female with a normal exam.  No Pap smear was sent, she is high risk HPV negative in 2024.    Routine mammogram was ordered with tomosynthesis.    We discussed colon cancer screening options, and a Cologuard was ordered.    A refill for birth control was ordered to the pharmacy.    I will see her in 1 year.  Education reviewed: self breast exams.  Contraception: OCP (estrogen/progesterone).  Mammogram ordered.

## 2025-02-20 ENCOUNTER — PHARMACY VISIT (OUTPATIENT)
Dept: PHARMACY | Facility: CLINIC | Age: 50
End: 2025-02-20
Payer: COMMERCIAL

## 2025-03-15 RX ORDER — PHENTERMINE HYDROCHLORIDE 37.5 MG/1
TABLET ORAL
Qty: 30 TABLET | Refills: 2 | Status: CANCELLED | OUTPATIENT
Start: 2025-03-15

## 2025-03-19 PROCEDURE — RXMED WILLOW AMBULATORY MEDICATION CHARGE

## 2025-03-19 RX ORDER — PHENTERMINE HYDROCHLORIDE 37.5 MG/1
TABLET ORAL
Qty: 30 TABLET | Refills: 2 | Status: SHIPPED | OUTPATIENT
Start: 2025-03-19

## 2025-03-20 ENCOUNTER — PHARMACY VISIT (OUTPATIENT)
Dept: PHARMACY | Facility: CLINIC | Age: 50
End: 2025-03-20
Payer: COMMERCIAL

## 2025-04-17 PROCEDURE — RXMED WILLOW AMBULATORY MEDICATION CHARGE

## 2025-04-21 ENCOUNTER — PHARMACY VISIT (OUTPATIENT)
Dept: PHARMACY | Facility: CLINIC | Age: 50
End: 2025-04-21
Payer: COMMERCIAL

## 2025-05-14 PROCEDURE — RXMED WILLOW AMBULATORY MEDICATION CHARGE

## 2025-05-15 ENCOUNTER — PHARMACY VISIT (OUTPATIENT)
Dept: PHARMACY | Facility: CLINIC | Age: 50
End: 2025-05-15
Payer: COMMERCIAL

## 2025-05-19 ENCOUNTER — PATIENT OUTREACH (OUTPATIENT)
Dept: CARE COORDINATION | Facility: CLINIC | Age: 50
End: 2025-05-19
Payer: COMMERCIAL

## 2025-05-19 NOTE — PROGRESS NOTES
Attempted to contact pt on several attempts regarding nutrition education referral. Pt was contacted via phone and/or [employer] system. Voicemail was left, if appropriate, with contact information for return call back. At this time, referral to be closed per policy.

## 2025-05-23 PROCEDURE — RXMED WILLOW AMBULATORY MEDICATION CHARGE

## 2025-05-27 ENCOUNTER — PHARMACY VISIT (OUTPATIENT)
Dept: PHARMACY | Facility: CLINIC | Age: 50
End: 2025-05-27
Payer: COMMERCIAL

## 2025-06-13 PROCEDURE — RXMED WILLOW AMBULATORY MEDICATION CHARGE

## 2025-06-13 RX ORDER — PHENTERMINE HYDROCHLORIDE 37.5 MG/1
TABLET ORAL
Qty: 30 TABLET | Refills: 2 | Status: SHIPPED | OUTPATIENT
Start: 2025-06-13

## 2025-06-17 ENCOUNTER — PHARMACY VISIT (OUTPATIENT)
Dept: PHARMACY | Facility: CLINIC | Age: 50
End: 2025-06-17
Payer: COMMERCIAL

## 2025-07-13 PROCEDURE — RXMED WILLOW AMBULATORY MEDICATION CHARGE

## 2025-07-16 ENCOUNTER — PHARMACY VISIT (OUTPATIENT)
Dept: PHARMACY | Facility: CLINIC | Age: 50
End: 2025-07-16
Payer: COMMERCIAL

## 2025-08-12 PROCEDURE — RXMED WILLOW AMBULATORY MEDICATION CHARGE

## 2025-08-13 ENCOUNTER — PHARMACY VISIT (OUTPATIENT)
Dept: PHARMACY | Facility: CLINIC | Age: 50
End: 2025-08-13
Payer: COMMERCIAL

## 2025-08-15 PROCEDURE — RXMED WILLOW AMBULATORY MEDICATION CHARGE

## 2025-08-18 ENCOUNTER — PHARMACY VISIT (OUTPATIENT)
Dept: PHARMACY | Facility: CLINIC | Age: 50
End: 2025-08-18
Payer: COMMERCIAL

## 2026-03-13 ENCOUNTER — APPOINTMENT (OUTPATIENT)
Facility: CLINIC | Age: 51
End: 2026-03-13
Payer: COMMERCIAL